# Patient Record
Sex: MALE | Race: WHITE | Employment: FULL TIME | URBAN - METROPOLITAN AREA
[De-identification: names, ages, dates, MRNs, and addresses within clinical notes are randomized per-mention and may not be internally consistent; named-entity substitution may affect disease eponyms.]

---

## 2020-12-21 ENCOUNTER — TELEPHONE (OUTPATIENT)
Dept: TRANSPLANT | Facility: CLINIC | Age: 70
End: 2020-12-21

## 2020-12-21 NOTE — TELEPHONE ENCOUNTER
"[facility] BREEZE  [id number]      LIVING KIDNEY DONOR EVALUATION  Donor First Name Adolfo Donor MRN    Donor Last Name Walt Completed 2020 11:34 AM    1950 Record ID [id number]   BREEZE Screen PASSED     Intended Recipient  Recipient First Name Wendy Recipient MRN    Recipient Last Name Nicho Relationship Close Friend   Recipient  1947 Recipient Diagnosis    Recipient's ABO      Donor Information  Age 70 Gender Male   Ht 180 cm (5' 11'') Race    Wt 104.3 kg (230 lbs) Ethnicity Not /   BMI 32.10 kg/m  Preferred Language English      Required No     Blood Type O   Demographics  Home Address [address] [place] [address] [address] [facility] # 4 [phone]   [facility] Type Mobile   State [facility] #    [facility] Code [address] Type    Country [place] Preferred Contact day Mon   Email [email or web address] Preferred Contact time 1:00 PM-4:00 PM   &&   Donor's Medical Information  Medical History Gout   Hypercholesterolemia   other (\"\") Medications Allopurinol   Atorvastatin   B12 Tablets   Calcium   Magnesium Oxide   Surgical History Arthroscopy, Left Shoulder   Arthroscopy, Right Knee   Rotator Cuff Repair, Left Allergies NKDA   Social History EtOH: Rare (1-2 drinks/year)   Illicit Drug Use: Denies   Tobacco: Denies Self-Reported Functional Status \"I am able to participate in strenuous sports such as swimming, singles tennis, football, basketball, or skiing\"   Family Medical History Cancer (denies)   Diabetes (Mother)   Heart Disease (Father, Sibling)   Hypertension (Grandparent)   Kidney Disease (denies)   Kidney Stones (denies) Exercise Frequency Exercise (3 X per week)   Review of Organ Systems  Review of Systems Airway or Lungs: No   Blood Disorder: No   Cancer: No   Diabetes,Thyroid,Adrenal,Endocrine Disorder: No   Digestive or Liver: No   Heart or Circulatory System: No   Immune Diseases: No   Kidneys and Bladder: No   Male Health: No   Muscles,Bones,Joints: No   Neuro: " No   Psych: No   &&   Donor's Social Information  Marital Status  Living Accommodation Lives in rented accommodation   Level of Education College or baccalaureate degree complete Living Arrangement With spouse   Employment Status Full Time Concerns: health and life insurance No   Employer [employer] Home Apartments Concerns: job security and lost income No   Occupation      Medical Insurance Status Has medical insurance     High Risk Behavior  High Risk Behaviors Blood transfusion < 12 months. (NO)   Commercial sex < 12 months. (NO)   Illicit IV drug use < 5yrs. (NO)   Male:male sexual contact < 5yrs. (NO)   Other high risk sexual contact < 12 months. (NO)   Reason for Donation  Referral Tx Candidate Reason for Donation Wendy is one luis eduardo best friends   Permission to Disclose Inquiry Yes Patient Comments    Donor Motivation Level Highly motivated donor     PCP Contact  [facility]   PCP [facility]   PCP Phone [phone]   Emergency Contact  First Name Yudith First Name Scooter   Last Name Walt Last Name Walt   Phone # [phone] Phone # [phone]   Phone Type Mobile Phone Type Mobile   Relationship Spouse Relationship Brother   Office Use  Reviewed By    Reviewed 12/21/2020 2:56 PM   Admin Folder Archive   Comments    Lost for Followup    Extended Comments    BREEZE ID [id number] survey status completed   Activity History  Call  Due Date 12/21/2020   Last Modified Date/Time 12/21/2020 1:12 PM   Comments

## 2020-12-22 DIAGNOSIS — Z00.5 TRANSPLANT DONOR EVALUATION: Primary | ICD-10-CM

## 2020-12-22 NOTE — TELEPHONE ENCOUNTER
Is abo O. Thinking over PEP.Takes Mg Oxide he says as a suppl for bones.Has been on Allopurinoll for gout for 8yrs.Will send info/forms.To [facility] for labs.

## 2021-01-25 ENCOUNTER — ALLIED HEALTH/NURSE VISIT (OUTPATIENT)
Dept: NURSING | Facility: CLINIC | Age: 71
End: 2021-01-25
Payer: MEDICARE

## 2021-01-25 VITALS
DIASTOLIC BLOOD PRESSURE: 88 MMHG | RESPIRATION RATE: 16 BRPM | OXYGEN SATURATION: 97 % | TEMPERATURE: 98 F | BODY MASS INDEX: 32.87 KG/M2 | SYSTOLIC BLOOD PRESSURE: 138 MMHG | HEIGHT: 70 IN | WEIGHT: 229.6 LBS | HEART RATE: 68 BPM

## 2021-01-25 DIAGNOSIS — Z01.818 ENCOUNTER FOR PREOPERATIVE EVALUATION OF LIVING DONOR OF KIDNEY: Primary | ICD-10-CM

## 2021-01-25 DIAGNOSIS — Z00.5 TRANSPLANT DONOR EVALUATION: ICD-10-CM

## 2021-01-25 LAB
ABO + RH BLD: NORMAL
ABO + RH BLD: NORMAL
ALBUMIN UR-MCNC: 30 MG/DL
APPEARANCE UR: CLEAR
BACTERIA #/AREA URNS HPF: ABNORMAL /HPF
BILIRUB UR QL STRIP: NEGATIVE
COLOR UR AUTO: YELLOW
GLUCOSE UR STRIP-MCNC: NEGATIVE MG/DL
HGB BLD-MCNC: 16 G/DL (ref 13.3–17.7)
HGB UR QL STRIP: NEGATIVE
KETONES UR STRIP-MCNC: NEGATIVE MG/DL
LEUKOCYTE ESTERASE UR QL STRIP: NEGATIVE
NITRATE UR QL: NEGATIVE
PH UR STRIP: 5 PH (ref 5–7)
PROT UR-MCNC: 0.35 G/L
PROT/CREAT 24H UR: 0.18 G/G CR (ref 0–0.2)
RBC #/AREA URNS AUTO: ABNORMAL /HPF
SOURCE: ABNORMAL
SP GR UR STRIP: 1.02 (ref 1–1.03)
SPECIMEN EXP DATE BLD: NORMAL
UROBILINOGEN UR STRIP-ACNC: 0.2 EU/DL (ref 0.2–1)
WBC #/AREA URNS AUTO: ABNORMAL /HPF

## 2021-01-25 PROCEDURE — 86900 BLOOD TYPING SEROLOGIC ABO: CPT | Performed by: SURGERY

## 2021-01-25 PROCEDURE — 36415 COLL VENOUS BLD VENIPUNCTURE: CPT | Performed by: SURGERY

## 2021-01-25 PROCEDURE — 84156 ASSAY OF PROTEIN URINE: CPT | Performed by: SURGERY

## 2021-01-25 PROCEDURE — 82043 UR ALBUMIN QUANTITATIVE: CPT | Performed by: SURGERY

## 2021-01-25 ASSESSMENT — MIFFLIN-ST. JEOR: SCORE: 1807.71

## 2021-01-25 NOTE — PROGRESS NOTES
Patient here for Living Donor visit.  Checked wt and ht.    Vitals taken (3 sets) at 15 min intervals.

## 2021-01-26 ENCOUNTER — TELEPHONE (OUTPATIENT)
Dept: TRANSPLANT | Facility: CLINIC | Age: 71
End: 2021-01-26

## 2021-01-26 LAB
CREAT SERPL-MCNC: 1.4 MG/DL (ref 0.66–1.25)
CREAT UR-MCNC: 215 MG/DL
GFR SERPL CREATININE-BSD FRML MDRD: 50 ML/MIN/{1.73_M2}
GLUCOSE SERPL-MCNC: 141 MG/DL (ref 70–99)
MICROALBUMIN UR-MCNC: 116 MG/L
MICROALBUMIN/CREAT UR: 53.95 MG/G CR (ref 0–17)

## 2021-01-26 NOTE — TELEPHONE ENCOUNTER
Went over results of Phase 1's. States normally his BP's used to be normal but now were high.Also had fasted for labs but glucose was at 141.Cr and GFR also off. Is going to get a physical done now.Will hold off with further tests.

## 2021-01-27 ENCOUNTER — DOCUMENTATION ONLY (OUTPATIENT)
Dept: TRANSPLANT | Facility: CLINIC | Age: 71
End: 2021-01-27

## 2021-03-09 ENCOUNTER — TELEPHONE (OUTPATIENT)
Dept: TRANSPLANT | Facility: CLINIC | Age: 71
End: 2021-03-09

## 2021-03-09 NOTE — TELEPHONE ENCOUNTER
SUZAN for Adolfo checking to see if he ever went to his DSr d/t ^BP's and high FBS results. Recip thought we were going to talk things over with him but on last conversation Adolfo knew he had to check into his own health now.